# Patient Record
(demographics unavailable — no encounter records)

---

## 2024-12-19 NOTE — PAST MEDICAL HISTORY
Your surgery has been scheduled for:__________________________________________    You should report to:  ____[facility], located on the [place] side of the first floor of the           [facility] ([phone])  ____[facility], located on the [facility] side of the            Second floor of the [facility] [phone])  ____3rd Floor [facility] located on the [facility] side of the [facility] [phone]  Please Note   - Tell your doctor if you take Aspirin, products containing Aspirin, herbal medications  or blood thinners, such as Coumadin, Ticlid, or Plavix.  (Consult your provider regarding holding or stopping before surgery).  - Arrange for someone to drive you home following surgery.  You will not be allowed to leave the surgical facility alone or drive yourself home following sedation and anesthesia.  Before Surgery  - Stop taking all herbal medications 14days prior to surgery  - No Motrin/Advil (Ibuprofen) 7 days before surgery  - No Aleve (Naproxen) 7 days before surgery  - Stop Taking Asprin, products containing Asprin _____days before surgery  - Stop taking blood thinners_______days before surgery  - Refrain from drinking alcoholic beverages for 24hours before and after surgery  - Stop or limit smoking _________days before surgery  Night before Surgery  - DO NOT EAT OR DRINK ANYTHING AFTER MIDNIGHT, INCLUDING GUM, HARD CANDY, MINTS, OR CHEWING TOBACCO.  - Take a shower or bath (shower is recommended).  Bathe with Hibiclens soap or an antibacterial soap from the neck down.  If not supplied by your surgeon, hibiclens soap will need to be purchased over the counter in pharmacy.  Rinse soap off thoroughly.  - Shampoo your hair with your regular shampoo  The Day of Surgery  - Take another bath or shower with hibiclens or any antibacterial soap, to reduce the chance of infection.  - Take heart and blood  pressure medications with a small sip of water, as advised by the perioperative team.  - Do not take fluid pills  - You may brush your teeth and rinse your mouth, but do not swall any additional water.   - Do not apply perfumes, powder, body lotions or deodorant on the day of surgery.  - Nail polish should be removed.  - Do not wear makeup or moisturizer  - Wear comfortable clothes, such as a button front shirt and loose fitting pants.  - Leave all jewelry, including body piercings, and valuables at home.    - Bring any devices you will neeed after surgery such as crutches or canes.  - If you have sleep apnea, please bring your CPAP machine  In the event that your physical condition changes including the onset of a cold or respiratory illness, or if you have to delay or cancel your surgery, please notify your surgeon.Anesthesia: General Anesthesia  Youre due to have surgery. During surgery, youll be given medication called anesthesia. (It is also called anesthetic.) This will keep you comfortable and pain-free. Your surgeon will use general anesthesia. This sheet tells you more about it.    What Is General Anesthesia?  General anesthesia puts you into a state like deep sleep. It goes into the bloodstream (IV anesthetics), into the lungs (gas anesthetics), or both. You feel nothing during the procedure. You will not remember it. During the procedure, the anesthesia provider monitors you. He or she checks your heart rate and rhythm, blood pressure, and blood oxygen.  · IV Anesthetics  IV anesthetics are given through an IV line in your arm. Theyre often given first. This is so you are asleep before a gas anesthetic is started. Some kinds of IV anesthetics relieve pain. Others relax you. Your doctor will decide which kind is best in your case.  · Gas Anesthetics  Gas anesthetics are breathed into the lungs. They are often used to keep you asleep. They can be given through a facemask, an endotracheal tube, or a  laryngeal mask airway.  ¨ If you have a facemask, your anesthesia provider will most likely place it over your nose and mouth while youre still awake. Youll breathe oxygen through the mask as your IV anesthetic is started. Gas anesthetic may be added through the mask.  ¨ If you have an endotracheal tube or a laryngeal mask airway, it will be inserted into your throat after youre asleep.  Anesthesia Tools and Medications  You will likely have:  · IV anesthetics sent through an IV line into your bloodstream.  · Gas anesthetics breathed into your lungs, where they pass into your bloodstream.  · A pulse oximeter on the end of your finger. This measures your blood oxygen level.  · Electrocardiography leads (electrodes) on your chest. These record your heart rate and rhythm.  · A blood pressure cuff. This reads your blood pressure.  Risks and Possible Complications  General anesthesia has some risks. These include:  · Breathing problems  · Nausea and vomiting  · Sore throat or hoarseness  · Allergic reaction to the anesthetic  · Ongoing numbness (rare)  · Irregular heartbeat (rare)  · Cardiac arrest (rare)   Anesthesia Safety  · Follow all instructions you are given for how long not to eat or drink before your procedure.  · Be sure your doctor knows what medications you take. This includes over-the-counter medications, herbs, and supplements.  · Have an adult family member or friend drive you home after the procedure.  · For the first 24 hours after your surgery:  ¨ Do not drive or use heavy equipment.  ¨ Do not make important decisions or sign documents.  ¨ Avoid alcohol.  ¨ Have someone stay with you, if possible. They can watch for problems and help keep you safe.  © 2444-9406 [employer], [address] [place] [address], [place] [address]. All rights reserved. This information is not intended as a substitute for professional medical care. Always follow your healthcare professional's instructions.   [Menstruating] : The patient is menstruating [Definite ___ (Date)] : the last menstrual period was [unfilled] [Regular Cycle Intervals] : have been regular [Total Preg ___] : G[unfilled] [Live Births ___] : P[unfilled]  [Full Term ___] : Full Term: [unfilled] [Living ___] : Living: [unfilled] [Menarche Age ____] : age at menarche was [unfilled] [Normal Amount/Duration] : it was of a normal amount and duration [Abortions ___] : Abortions:[unfilled] [AB Spont ___] : miscarriages: [unfilled]

## 2024-12-19 NOTE — OB HISTORY
[Living ___] : [unfilled] (living) [Vaginal ___] : [unfilled] vaginal delivery(s) [Definite ___ (Date)] : the last menstrual period was [unfilled] [Normal Amount/Duration] : was of a normal amount and duration [Regular Cycle Intervals] : periods have been regular [Total Preg ___] : : [unfilled] [Full Term ___] : [unfilled] (full-term) [AB Spont ___] : [unfilled] miscarriage(s) [Menarche Age ____] : age at menarche was [unfilled]

## 2024-12-19 NOTE — PAST MEDICAL HISTORY
[Menstruating] : The patient is menstruating [Definite ___ (Date)] : the last menstrual period was [unfilled] [Regular Cycle Intervals] : have been regular [Total Preg ___] : G[unfilled] [Live Births ___] : P[unfilled]  [Full Term ___] : Full Term: [unfilled] [Living ___] : Living: [unfilled] [Menarche Age ____] : age at menarche was [unfilled] [Normal Amount/Duration] : it was of a normal amount and duration [Abortions ___] : Abortions:[unfilled] [AB Spont ___] : miscarriages: [unfilled]

## 2024-12-27 NOTE — PHYSICAL EXAM
[Chaperone Present] : A chaperone was present in the examining room during all aspects of the physical examination [Fully active, able to carry on all pre-disease performance without restriction] : Status 0 - Fully active, able to carry on all pre-disease performance without restriction [23039] : A chaperone was present during the pelvic exam. [FreeTextEntry2] : Mili [Absent] : CVAT: absent [Normal] : Recto-Vaginal Exam: Normal [de-identified] : soft, NT, ND no masses or ascites

## 2024-12-27 NOTE — PHYSICAL EXAM
[Chaperone Present] : A chaperone was present in the examining room during all aspects of the physical examination [Fully active, able to carry on all pre-disease performance without restriction] : Status 0 - Fully active, able to carry on all pre-disease performance without restriction [21617] : A chaperone was present during the pelvic exam. [FreeTextEntry2] : Mili [Absent] : CVAT: absent [Normal] : Recto-Vaginal Exam: Normal [de-identified] : soft, NT, ND no masses or ascites

## 2024-12-27 NOTE — PROCEDURE
[Colposcopy] : colposcopy [Abnormal Pap] : an abnormal pap smear [LGSIL] : low grade squamous intraepithelial lesion [HPV high risk] : PCR positive for high risk HPV [Patient] : the patient [Written Consent] : written consent was obtained prior to the procedure and is detailed in the patient's record [Allergies Reviewed] : Allergies reviewed [Cervical Pap Performed] : a cervical pap smear was not performed [SCJ Fully Visualized] : the squamocolumnar junction was fully visualized [Acetowhite ___ o'clock] : ascetowhite changes at the [unfilled] ~Uo'clock position [No Abnormalities] : no abnormalities seen [Biopsies Taken: # ___] : [unfilled] biopsies taken of the cervix [ECC Done] : an Endocervical curettage was performed.  [Biopsy Locations ___ o'clock] : the biopsies were taken at the [unfilled] o'clock position [Silver Nitrate] : silver nitrate [No Complications] : none [Tolerated Well] : the patient tolerated the procedure well [Post procedure instructions and information given] : post procedure instructions and information given and reviewed with patient. [1] : 1

## 2024-12-27 NOTE — ASSESSMENT
[FreeTextEntry1] : 37 y.o. woman with persistent low-grade abnormal paps with +hrHPV. Colpo today demonstrates 2 small WE lesions on the cervix, one of which was biopsied to confirm the diagnosis. An ecc was also performed. She tolerated the procedure well and I asked her to follow up with me in about a month to review the diagnosis and discuss management options. She is interested in possibly having more children in the future.

## 2024-12-27 NOTE — PHYSICAL EXAM
[Chaperone Present] : A chaperone was present in the examining room during all aspects of the physical examination [Fully active, able to carry on all pre-disease performance without restriction] : Status 0 - Fully active, able to carry on all pre-disease performance without restriction [48292] : A chaperone was present during the pelvic exam. [FreeTextEntry2] : Mili [Absent] : CVAT: absent [Normal] : Recto-Vaginal Exam: Normal [de-identified] : soft, NT, ND no masses or ascites

## 2024-12-27 NOTE — PHYSICAL EXAM
[Chaperone Present] : A chaperone was present in the examining room during all aspects of the physical examination [Fully active, able to carry on all pre-disease performance without restriction] : Status 0 - Fully active, able to carry on all pre-disease performance without restriction [30982] : A chaperone was present during the pelvic exam. [FreeTextEntry2] : Mili [Absent] : CVAT: absent [Normal] : Recto-Vaginal Exam: Normal [de-identified] : soft, NT, ND no masses or ascites

## 2024-12-27 NOTE — HISTORY OF PRESENT ILLNESS
[FreeTextEntry1] : Referred by Dr. Cox PCP: Dr. Oconnor   Ms. HERNANDEZ is a 37 year old  female, seen at the request of Dr. Cox for further evaluation and management of persistent low-grade abnormal pap test. The patient reports a three year history of persistently abnormal paps demonstrating LGSIL with +hrHPV. She denies bleeding or discharge. Her periods are regular with her LMP on 12/10/24. She denies bloating, abdominal distention, or changes in bowel or urinary habits. She is , but currently has a new male partner and is sexually active.  11/15/24 Pap- LSIL/HPV(+)  20 Pap- LSIL/HPV (+)  PMHx- Reports no past medical history. PSHx- Breast augmentation Family hx of cancer- Mother with appendiceal cancer, diagnosed at age 52. Father with esophageal cancer, diagnosed at age 46.   HM Pap- See above

## 2024-12-27 NOTE — LETTER BODY
[Dear  ___] : Dear  [unfilled], [I had the pleasure of evaluating your patient, [unfilled] for ___] : I had the pleasure of evaluating your patient, [unfilled] for [unfilled]. [FreeTextEntry2] : As you will recall, she is a healthy 37 y.o. with a 3-4 year history of low-grade abnormal paps. I did colpo and biopsies today and asked her to follow up in a month to review and discuss treatment plan. I hope this meets with your approval.

## 2025-01-30 NOTE — HISTORY OF PRESENT ILLNESS
[FreeTextEntry1] : Referred by Dr. Cox PCP: Dr. Oconnor   Ms. HERNANDEZ is a 37 year old  female, initially seen at the request of Dr. Cox for further evaluation and management of persistent low-grade abnormal pap test. The patient reports a three year history of persistently abnormal paps demonstrating LGSIL with +hrHPV. She denies bleeding or discharge. Her periods are regular with her LMP on 25. She denies bloating, abdominal distention, or changes in bowel or urinary habits. She is , but currently has a new male partner and is sexually active. Colposcopy revealed WE lesion and Biopsy: 1. Endocervix, curettage: Focal low-grade squamous intraepithelial lesion, CIN1 2.  Cervix, 5 oclock, biopsy: High-grade squamous intraepithelial lesion, CIN2.  Immunohistochemical stain p16 support the above diagnosis. In the office today she feels   PMHx- Reports no past medical history. PSHx- Breast augmentation Family hx of cancer- Mother with appendiceal cancer, diagnosed at age 52. Father with esophageal cancer, diagnosed at age 46. Soc Hx. works for [employer] and has a partner   HM Pap- See above

## 2025-01-30 NOTE — PHYSICAL EXAM
[Absent] : CVAT: absent [Normal] : Recto-Vaginal Exam: Normal [Fully active, able to carry on all pre-disease performance without restriction] : Status 0 - Fully active, able to carry on all pre-disease performance without restriction [FreeTextEntry3] : carried over from last visit [de-identified] : soft, NT, ND no masses or ascites

## 2025-01-30 NOTE — ASSESSMENT
[FreeTextEntry1] : 37 y.o. woman with biopsy proven CINII of the ectocervix at 5 o'clock and s positive ECC with low-grade dysplasia. Colpo previously demonstrated 2 small WE lesions on the cervix. Given the high-grade nature and a positive ECC I have recommended an excisional procedure. We discussed a CKC including indications, alternatives, complications and potential outcomes. All her questions were answered and she would like to do this in March. She is interested in possibly having more children in the future and we discussed the pregnancy risks of a CKC. We will set her up.

## 2025-01-30 NOTE — OB HISTORY
[Total Preg ___] : : [unfilled] [Full Term ___] : [unfilled] (full-term) [Living ___] : [unfilled] (living) [Vaginal ___] : [unfilled] vaginal delivery(s) [AB Spont ___] : [unfilled] miscarriage(s) [Definite ___ (Date)] : the last menstrual period was [unfilled] [Normal Amount/Duration] : was of a normal amount and duration [Regular Cycle Intervals] : periods have been regular [Menarche Age ____] : age at menarche was [unfilled]

## 2025-01-30 NOTE — PAST MEDICAL HISTORY
[Menstruating] : The patient is menstruating [Menarche Age ____] : age at menarche was [unfilled] [Definite ___ (Date)] : the last menstrual period was [unfilled] [Normal Amount/Duration] : it was of a normal amount and duration [Regular Cycle Intervals] : have been regular [Total Preg ___] : G[unfilled] [Live Births ___] : P[unfilled]  [Full Term ___] : Full Term: [unfilled] [Abortions ___] : Abortions:[unfilled] [Living ___] : Living: [unfilled] [AB Spont ___] : miscarriages: [unfilled]

## 2025-03-27 NOTE — LETTER BODY
[Dear  ___] : Dear  [unfilled], [I had the pleasure of evaluating your patient, [unfilled] for ___] : I had the pleasure of evaluating your patient, [unfilled] for [unfilled]. [I recently saw our patient [unfilled] for a follow-up visit.] : I recently saw our patient, [unfilled] for a follow-up visit. [FreeTextEntry2] : As you will recall, she is a healthy 37 y.o. with a 3-4 year history of low-grade abnormal paps. I did a CKC on 3/7/25 for high grade dysplasia that was extending into the endocervical canal and she had a postive endondocervical margin. She is doing well and I asked her to follow up with me in 6 months so I can do another ECC to see if we need to evaluate her further.  I hope this meets with your approval. She was happy with the plan.

## 2025-03-27 NOTE — ASSESSMENT
[FreeTextEntry1] : 37 y.o. woman with  LEIGH ANN II of the ectocervix and extending into the endocervical canal leading to a positive margin. She is recovering nicely and the discharge she has is normal and I expect it to resolve in a couple of weeks. Given her positive endocervical margin I asked her to follow up in 6 months to repeat an ECC. She expressed understanding and all her questions were answered. Follow up in 6 months for re-evaluation and to perform an ECC.

## 2025-03-27 NOTE — HISTORY OF PRESENT ILLNESS
[FreeTextEntry1] : Referred by Dr. Cox PCP: Dr. Oconnor   Ms. HERNANDEZ is a 37 year old  female, initially seen at the request of Dr. Cox for further evaluation and management of persistent low-grade abnormal pap test. I previously did colposcopy and biopsies that revealled high-grade dysplasia with a positive ECC. On 3/7/25 I performed a CKC with the final path confirming LEIGH ANN@ in the setting of CIN1 but her endocervical margin was positive. In the office today she denies bloating, abdominal distention, or changes in bowel or urinary habits. She had some post op bloody discharge that converted to a yellow discharge that persists today. She denies fever but she feel well otherwise.   PMHx- Reports no past medical history. PSHx- Breast augmentation Family hx of cancer- Mother with appendiceal cancer, diagnosed at age 52. Father with esophageal cancer, diagnosed at age 46. Soc Hx. works for [employer] and has a partner   HM Pap- See above

## 2025-03-27 NOTE — PHYSICAL EXAM
[Absent] : CVAT: absent [Normal] : Recto-Vaginal Exam: Normal [Fully active, able to carry on all pre-disease performance without restriction] : Status 0 - Fully active, able to carry on all pre-disease performance without restriction [Chaperone Present] : A chaperone was present in the examining room during all aspects of the physical examination [FreeTextEntry2] : jeannie [de-identified] : soft, NT, ND no masses or ascites [de-identified] : with yellow discharge [de-identified] : cone bed is still healing with a physiologic yellow discharge. the right suture was still visible.